# Patient Record
Sex: MALE | Race: WHITE
[De-identification: names, ages, dates, MRNs, and addresses within clinical notes are randomized per-mention and may not be internally consistent; named-entity substitution may affect disease eponyms.]

---

## 2017-01-17 ENCOUNTER — HOSPITAL ENCOUNTER (EMERGENCY)
Dept: HOSPITAL 75 - ER | Age: 27
LOS: 1 days | Discharge: HOME | End: 2017-01-18
Payer: SELF-PAY

## 2017-01-17 VITALS — WEIGHT: 140 LBS | BODY MASS INDEX: 20.73 KG/M2 | HEIGHT: 69 IN

## 2017-01-17 DIAGNOSIS — F17.210: ICD-10-CM

## 2017-01-17 DIAGNOSIS — J09.X2: Primary | ICD-10-CM

## 2017-01-17 PROCEDURE — 87804 INFLUENZA ASSAY W/OPTIC: CPT

## 2017-01-17 PROCEDURE — 99282 EMERGENCY DEPT VISIT SF MDM: CPT

## 2017-01-17 NOTE — ED COUGH/URI
General


Chief Complaint:  Cough/Cold/Flu Symptoms


Stated Complaint:  FEVER,VOMITING,COUGH


Nursing Triage Note:  


c/o cough and congestion with fever x 2 days


Source:  patient





History of Present Illness


Time seen by provider:  23:25


Initial Comments


PT STATES HE HAS BEEN SICK SINCE YESTERDAY


HAS HAD FEVER .9


C/O NON-PRODUCTIVE COUGH, AND CLEAR NASAL DRAINAGE


C/O HEADACHE


C/O BODY ACHES


HAS VOMITED X 2 SECONDARY TO COUGHING AND GAGGING


TOOK OTC COLD/FLU MEDICATION  MG OF IBUPROFEN JUST PRIOR TO ARRIVAL AND 

STATES IT HAS HELPED


MULTIPLE SICK CONTACTS WITH SAME--STATES KIDS ALL HAVE "BRONCHITIS OR PNEUMONIA

" AND URI SYMPTOMS





PCP: [facility]





Allergies and Home Medications


Allergies


Coded Allergies:  


     No Known Allergies (Unverified  Allergy, Mild, 4/22/09)


     No Known Drug Allergies (Unverified  Allergy, Mild, 5/2/09)





Home Medications


Benzonatate 100 Mg Capsule #30 1-2 TAB PO TID 


   Prescribed by: JOSE ANTONIO NORIEGA on 1/17/17 2356


Dextroamphetamine/Amphetamine 20 Mg Tablet #60 (Reported) 


Naproxen 500 Mg Tablet #20 500 MG PO BID PRN PRN PAIN 


   Prescribed by: LAVERNE CHEW on 9/11/16 1950


Oseltamivir Phosphate 75 Mg Cap #10 75 MG PO BID 


   Prescribed by: JOSE ANTONIO NORIEGA on 1/17/17 2356





Constitutional:   see HPI fever malaise


EENTM:   nose congestion see HPI


Respiratory:   see HPI coughNo short of breath, No wheezing


Cardiovascular:   no symptoms reported


Gastrointestinal:   see HPINo abdominal pain, No diarrhea, No nausea,  vomiting


Genitourinary:   no symptoms reported


Musculoskeletal:   see HPI (BODY ACHES)


Skin:   no symptoms reported


Psychiatric/Neurological:   See HPI Headache


Hematologic/Lymphatic:   No Symptoms Reported


Immunological/Allergic:   no symptoms reported





Past Medical-Social-Family Hx


Patient Social History


Alcohol Use:  Occasionally Uses


Recreational Drug Use:  No


Smoking Status:  Current Everyday Smoker (1 PPD)


Type Used:  Cigarettes


Recent Foreign Travel:  No


Contact w/Someone Who Travel:  No


Recent Infectious Disease Expo:  No


Recent Hopitalizations:  No


Physical Abuse Screen:  No


Sexual Abuse:  No





Immunizations Up To Date


Tetanus Booster (TDap):  Unknown


Date of Influenza Vaccine:  Nov 14, 2013





Seasonal Allergies


Seasonal Allergies:  No





Surgeries


HX Surgeries:  No





Respiratory


Hx Respiratory Disorders:  No





Cardiovascular


Hx Cardiac Disorders:  No





Neurological


Hx Neurological Disorders:  No





Reproductive System


Hx Reproductive Disorders:  No


Sexually Transmitted Disease:  No


HIV/AIDS:  No





Genitourinary


Hx Genitourinary Disorders:  No





Gastrointestinal


Hx Gastrointestinal Disorders:  No





Musculoskeletal


Hx Musculoskeletal Disorders:  Yes


Musculoskeletal Disorders:  Chronic Back Pain





Endocrine


Hx Endocrine Disorders:  No





HEENT


HX ENT Disorders:  No





Cancer


Hx Cancer:  No





Psychosocial


Hx Psychiatric Problems:  Yes (WAS ON CELEXA, BUT RAN OUT 2 WEEKS AGO, PER PT 

ON 01/17/17)


Behavioral Health Disorders:  Depression





Integumentary


HX Skin/Integumentary Disorder:  No





Blood Transfusions


Hx Blood Disorders:  No


Adverse Reaction to a Blood Tr:  No





Physical Exam


Vital Signs





 Vital Sign - Last 12Hours








 1/17/17 1/17/17





 23:21 23:30


 


Temp 98.8 


 


Pulse 110 


 


Resp 16 


 


B/P 109/77 


 


Pulse Ox 96 


 


O2 Delivery  Room Air





Capillary Refill : Less Than 3 Seconds


General Appearance:   other (LOOKS MILDLY ILL) thin


HEENT:   PERRL/EOMI TMs normal pharynx normal other (NASAL MUCOSAL CONGESTION 

AND CLEAR RHINORRHEA. POOR DENTITION )


Neck:   non-tender full range of motion supple normal inspection


Respiratory:   normal breath sounds no respiratory distress no accessory muscle 

use


Cardiovascular:   regular rate, rhythm no murmur


Gastrointestinal:   normal bowel sounds non tender soft


Extremities:   normal inspection


Neurologic/Psychiatric:   CNs II-XII nml as tested no motor/sensory deficits 

alert normal mood/affect oriented x 3


Skin:   normal color warm/dryNo rash,  tattoos/piercings (MUJLTIPLE TATTOOS)





Progress/Results/Core Measures


Results/Orders


Micro Results





 Microbiology


1/17/17 Influenza Types A,B Antigen (YUNIOR) - Final, Complete


          





My Orders





 Orders-JOSE ANTONIO NORIEGA DO


Influenza A And B Antigens (1/17/17 23:29)





Vital Signs/I&O





 Vital Sign - Last 12Hours








 1/17/17 1/17/17 1/18/17





 23:21 23:30 00:06


 


Temp 98.8  


 


Pulse 110  83


 


Resp 16  20


 


B/P 109/77  


 


Pulse Ox 96  95


 


O2 Delivery  Room Air 








Blood Pressure Mean:  88





Departure


Impression


Impression:  


 Primary Impression:  


 Influenza A


Disposition:  01 HOME, SELF-CARE


Condition:  Improved





Departure-Patient Inst.


Referrals:  


[facility] (PCP/Family)


Primary Care Physician


Patient Instructions:  Flu, Adult (DC)





Add. Discharge Instructions:


ROBITUSSIN DM FOR COUGH





TYLENOL 1 GRAM /MOTRIN 800 MG 4 TIMES A DAY AS NEEDED FOR PAIN OR FEVER





FOLLOW UP WITH DR OF CHOICE IN 4-5 DAYS IF NO BETTER





All discharge instructions reviewed with patient and/or family. Voiced 

understanding.


Scripts


Benzonatate (Tessalon Perle)100 Mg Capsule1-2 Tab PO TID Cough #30 CAP


   Prov:JOSE ANTONIO NORIEGA DO         1/17/17


Oseltamivir Phosphate (Tamiflu)75 Mg Cap75 Mg PO BID #10 CAP


   Prov:JOSE ANTONIO NORIEGA DO         1/17/17


Work/School Note:  Work Release Form   Date Seen in the Emergency Department:  

Jan 17, 2017


   Return to Work:  Jan 20, 2017








JOSE ANTONIO NORIEGA DO Jan 17, 2017 23:56

## 2017-01-18 VITALS — DIASTOLIC BLOOD PRESSURE: 79 MMHG | SYSTOLIC BLOOD PRESSURE: 112 MMHG

## 2017-04-18 ENCOUNTER — HOSPITAL ENCOUNTER (EMERGENCY)
Dept: HOSPITAL 75 - ER | Age: 27
Discharge: HOME | End: 2017-04-18
Payer: SELF-PAY

## 2017-04-18 VITALS — DIASTOLIC BLOOD PRESSURE: 96 MMHG | SYSTOLIC BLOOD PRESSURE: 154 MMHG

## 2017-04-18 VITALS — BODY MASS INDEX: 20.73 KG/M2 | HEIGHT: 69 IN | WEIGHT: 140 LBS

## 2017-04-18 DIAGNOSIS — F17.210: ICD-10-CM

## 2017-04-18 DIAGNOSIS — K02.9: ICD-10-CM

## 2017-04-18 DIAGNOSIS — K04.7: Primary | ICD-10-CM

## 2017-04-18 PROCEDURE — 99282 EMERGENCY DEPT VISIT SF MDM: CPT

## 2017-04-18 NOTE — ED EENT
History of Present Illness


General


Chief Complaint:  Dental Problems/Pain


Stated Complaint:  DENTAL PAIN


Nursing Triage Note:  


pt c/o left lower dental pain x 2 days, severe tonight.


Source:  patient





History of Present Illness


Time seen by provider:  03:25


Initial Comments


C/O DENTAL PAIN FOR THE PAST COUPLE OF DAYS, WORSE TONIGHT


PT HAS CHRONIC DENTAL PROBLEMS, HAS NOT SEEN A DENTIST IN YEARS


DID GO TO [facility] A FEW YEARS AGO FOR THIS PROBLEM AND WAS REFERRED 

TO AN ORAL SURGEON TO HAVE TEETH REMOVED, BUT PT NEVER WENT DUE TO FINANCES.





NO FEVER


NO SWELLING OF FACE/JAW





PCP; [facility]





Allergies and Home Medications


Allergies


Coded Allergies:  


     NKANo Known Allergies (Unverified  Allergy, Mild, 4/22/09)


     No Known Drug Allergies (Unverified , 5/2/09)





Home Medications


Amoxicillin/Potassium Clav 1 Each Tablet, 1 EACH PO BID, #20


   Prescribed by: JOSE ANTONIO NORIEGA on 4/18/17 0335


Lidocaine HCl 15 Ml Solution, 0 MM Q 1-2 HOURS, #50


   Prescribed by: JOSE ANTONIO NORIEGA on 4/18/17 0335


Naproxen 500 Mg Tablet, 500 MG PO BID, #20


   Prescribed by: JOSE ANTONIO NORIEGA on 4/18/17 0335


[ibuprofen]  , (Reported)





Review of Systems


Constitutional:  no symptoms reported


Eyes:  No Symptoms Reported


Ears:  No Symptoms Reported


Nose:  no symptoms reported


Mouth:  see HPI


Throat:  no symptoms reported


Respiratory:  no symptoms reported


Cardiovascular:  no symptoms reported


Gastrointestinal:  no symptoms reported


Musculoskeletal:  no symptoms reported


Skin:  no symptoms reported


Neurological:  No Symptoms Reported


Hematologic/Lymphatic:  No Symptoms Reported





Past Medical-Social-Family Hx


Patient Social History


Alcohol Use:  Occasionally Uses


Recreational Drug Use:  No


Smoking Status:  Current Everyday Smoker (1 PPD)


Type Used:  Cigarettes


Recent Foreign Travel:  No


Contact w/Someone Who Travel:  No


Recent Infectious Disease Expo:  No


Recent Hopitalizations:  No





Immunizations Up To Date


Tetanus Booster (TDap):  Unknown


Date of Influenza Vaccine:  Nov 14, 2013





Seasonal Allergies


Seasonal Allergies:  No





Surgeries


HX Surgeries:  No





Respiratory


Hx Respiratory Disorders:  No





Cardiovascular


Hx Cardiac Disorders:  No





Neurological


Hx Neurological Disorders:  No





Reproductive System


Hx Reproductive Disorders:  No


Sexually Transmitted Disease:  No


HIV/AIDS:  No





Genitourinary


Hx Genitourinary Disorders:  No





Gastrointestinal


Hx Gastrointestinal Disorders:  No





Musculoskeletal


Hx Musculoskeletal Disorders:  Yes


Musculoskeletal Disorders:  Chronic Back Pain





Endocrine


Hx Endocrine Disorders:  No





HEENT


HX ENT Disorders:  Yes (DENTAL CARIES/ABSCESSES, CHRONIC DENTAL PAIN )





Cancer


Hx Cancer:  No





Psychosocial


Hx Psychiatric Problems:  Yes


Behavioral Health Disorders:  Depression





Integumentary


HX Skin/Integumentary Disorder:  No





Blood Transfusions


Hx Blood Disorders:  No


Adverse Reaction to a Blood Tr:  No





Physical Exam


Vital Signs





Vital Sign - Last 12Hours








 4/18/17





 03:24


 


Temp 96.9


 


Pulse 73


 


Resp 16


 


B/P (MAP) 160/105








General Appearance:  WD/WN, no apparent distress, other (HOLDING LEFT JAW AREA)


Eyes:  bilateral eye EOMI, bilateral eye PERRL, bilateral eye normal inspection

, bilateral eye other (GLASSES)


Ears:  bilateral ear TM normal


Nose:  normal inspection


Mouth/Throat:  other (EXTENSIVE DENTAL DECAY--MANY TEETH, ESPECIALLY ALL MOLARS

,  DECAYED DOWN TO GUMS--LEFT LOWER MOLARS WITH MODERATE SURROUNDING GUM TISSUE 

INFLAMED, SWOLLED AND VERY TENDER. NO MANDIBULAR SWELLING. )


Neck:  non-tender, full range of motion, supple, normal inspection, No 

lymphadenopathy (R), No lymphadenopathy (L)


Cardiovascular:  regular rate, rhythm, no murmur


Respiratory:  normal breath sounds, no respiratory distress, no accessory 

muscle use


Gastrointestinal:  non tender, soft


Neurologic/Psychiatric:  CNs II-XII nml as tested, no motor/sensory deficits, 

alert, oriented x 3


Skin:  normal color, warm/dry





Progress/Results/Core Measures


Results/Orders


My Orders





Orders - JOSE ANTONIO NORIEGA DO


Amoxicillin/Clavulanate Tablet (Augmenti (4/18/17 07:00)


Rx-Naproxen (Rx-Naprosyn) (4/18/17 03:32)


Lidocaine 2% Viscous 15 Ml (Xylocaine Vi (4/18/17 03:45)





Vital Signs/I&O





Vital Sign - Last 12Hours








 4/18/17





 03:24


 


Temp 96.9


 


Pulse 73


 


Resp 16


 


B/P (MAP) 160/105














Blood Pressure Mean:  123











Departure


Impression


Impression:  


 Primary Impression:  


 Dental caries


 Additional Impression:  


 Dental abscess


Disposition:  01 HOME, SELF-CARE


Condition:  Stable





Departure-Patient Inst.


Referrals:  


[facility] (PCP/Family)


Primary Care Physician


Patient Instructions:  Tooth Decay, Adult (DC), Tooth Abscess (DC)





Add. Discharge Instructions:  


FREQUENT SALT WATER AND LISTERINE SWISHES





FOLLOW UP WITH [facility] THIS WEEK FOR FURTHER CARE





All discharge instructions reviewed with patient and/or family. Voiced 

understanding.


Scripts


Naproxen (Naproxen) 500 Mg Tablet


500 MG PO BID, #20 TAB


   Prov: JOSE ANTONIO NORIEGA DO         4/18/17 


Lidocaine HCl (Lidocaine HCl Viscous) 15 Ml Solution


0 MM Q 1-2 HOURS for Pain, #50 EA


   Prov: JOSE ANTONIO NORIEGA DO         4/18/17 


Amoxicillin/Potassium Clav (Augmentin 875-125 Tablet) 1 Each Tablet


1 EACH PO BID for INFECTION, #20 TAB


   Prov: JOSE ANTONIO NORIEGA DO         4/18/17











JOSE ANTONIO NORIEGA DO Apr 18, 2017 03:35

## 2018-02-06 ENCOUNTER — HOSPITAL ENCOUNTER (EMERGENCY)
Dept: HOSPITAL 75 - ER | Age: 28
Discharge: HOME | End: 2018-02-06
Payer: COMMERCIAL

## 2018-02-06 VITALS — WEIGHT: 140 LBS | HEIGHT: 69 IN | BODY MASS INDEX: 20.73 KG/M2

## 2018-02-06 DIAGNOSIS — K02.9: Primary | ICD-10-CM

## 2018-02-06 DIAGNOSIS — F17.210: ICD-10-CM

## 2018-02-06 DIAGNOSIS — F32.9: ICD-10-CM

## 2018-02-06 PROCEDURE — 99283 EMERGENCY DEPT VISIT LOW MDM: CPT

## 2018-02-06 NOTE — ED EENT
History of Present Illness


General


Chief Complaint:  Dental Problems/Pain


Stated Complaint:  DENTAL PAIN


Nursing Triage Note:  


PT TO ED 5 W/ C/O DENTAL PAIN, CHRONIC, WORSE LAST NOC.


Source:  patient


Exam Limitations:  no limitations





History of Present Illness


Date Seen by Provider:  Feb 6, 2018


Time Seen by Provider:  02:14


Initial Comments


Here with report of dental pain that has been going on over the last 24 hours.  

States it feels like last year in April when he had the same.  At that time he 

was prescribed Augmentin and that didn't help.  He knows he needs to follow 

with a dentist but has unfortunately been unable to due to finances.  This has 

improved this year and he has insurance now and his tax returns or come in and 

so he scheduling with a dentist in [place].  He needs oral surgeon to 

remove multiple molars that are terribly decayed down to the gum lines on both 

upper and lower and bilateral.


Timing/Duration:  gradual


Severity:  moderate


Location:  dental


Prearrival Treatment:  over the counter meds


Associated Symptoms:  No cough, No nasal congestion/drainage, No sore throat, 

tooth pain





Allergies and Home Medications


Allergies


Coded Allergies:  


     NKANo Known Allergies (Unverified  Allergy, Mild, 4/22/09)


     No Known Drug Allergies (Unverified , 5/2/09)





Home Medications


Amoxicillin/Potassium Clav 1 Each Tablet, 1 EACH PO BID, #20


   Prescribed by: JOSE ANTONIO NORIEGA on 4/18/17 0335


Lidocaine HCl 15 Ml Solution, 0 MM Q 1-2 HOURS, #50


   Prescribed by: JOSE ANTOINO NORIEGA on 4/18/17 0335


Naproxen 500 Mg Tablet, 500 MG PO BID, #20


   Prescribed by: JOSE ANTONIO NORIEGA on 4/18/17 0335


[ibuprofen]  , (Reported)





Review of Systems


Constitutional:  see HPI, No chills, No fever


Ears:  No Symptoms Reported


Nose:  no symptoms reported


Mouth:  see HPI, pain, denies purulent discharge


Throat:  no symptoms reported


Respiratory:  no symptoms reported


Cardiovascular:  no symptoms reported


Skin:  no symptoms reported





Past Medical-Social-Family Hx


Patient Social History


Alcohol Use:  Occasionally Uses


Recreational Drug Use:  No


Smoking Status:  Current Everyday Smoker


Type Used:  Cigarettes


Recent Foreign Travel:  No


Contact w/Someone Who Travel:  No


Recent Infectious Disease Expo:  No


Recent Hopitalizations:  No


Physical Abuse:  No


Sexual Abuse:  No


Mistreated:  No


Fear:  No





Immunizations Up To Date


Tetanus Booster (TDap):  Unknown


Date of Influenza Vaccine:  Nov 14, 2013





Seasonal Allergies


Seasonal Allergies:  No





Surgeries


History of Surgeries:  No





Respiratory


History of Respiratory Disorde:  No





Cardiovascular


History of Cardiac Disorders:  No





Neurological


History of Neurological Disord:  No





Reproductive System


Hx Reproductive Disorders:  No


Sexually Transmitted Disease:  No


HIV/AIDS:  No





Gastrointestinal


History of Gastrointestinal Di:  No





Musculoskeletal


History of Musculoskeletal Dis:  Yes


Musculoskeletal Disorders:  Chronic Back Pain





Endocrine


History of Endocrine Disorders:  No





Cancer


History of Cancer:  No





Psychosocial


History of Psychiatric Problem:  Yes


Behavioral Health Disorders:  Depression


Suicide Risk Score:  0





Integumentary


History of Skin or Integumenta:  No





Blood Transfusions


History of Blood Disorders:  No


Adverse Reaction to a Blood Tr:  No





Reviewed Nursing Assessment


Reviewed/Agree w Nursing PMH:  Yes





Family Medical History


Significant Family History:  No Pertinent Family Hx





Physical Exam


Vital Signs





Vital Sign - Last 12Hours








 2/6/18





 02:06


 


Temp 98.1


 


Pulse 88


 


Resp 20


 


B/P (MAP) 138/82 (100)


 


Pulse Ox 99


 


O2 Delivery Room Air








General Appearance:  WD/WN, no apparent distress


Eyes:  bilateral eye normal inspection, bilateral eye PERRL, bilateral eye EOMI


Ears:  bilateral ear auricle normal, bilateral ear canal normal, bilateral ear 

TM normal


Nose:  normal inspection


Mouth/Throat:  dental tenderness, other (bilateral upper and lower posterior 

teeth including all molars significantly decayed with mild erythema on the gum 

line around those teeth.  No significant abscess noted.)


Neck:  full range of motion, supple


Cardiovascular:  regular rate, rhythm, no murmur


Respiratory:  lungs clear, normal breath sounds


Neurologic/Psychiatric:  alert, oriented x 3


Skin:  normal color, warm/dry





Progress/Results/Core Measures


Results/Orders


My Orders





Orders - HOMER LARES MD


Lidocaine 2% Viscous 15 Ml (Xylocaine Vi (2/6/18 02:24)


Hydrocodone/Apap 7.5/325 Tab (Lortab 7. (2/6/18 02:27)


Augmentin 875mg Po (2/6/18 02:27)


Lidocaine 2% Viscous 15 Ml (Xylocaine Vi (2/6/18 02:30)





Vital Signs/I&O





Vital Sign - Last 12Hours








 2/6/18





 02:06


 


Temp 98.1


 


Pulse 88


 


Resp 20


 


B/P (MAP) 138/82 (100)


 


Pulse Ox 99


 


O2 Delivery Room Air














Blood Pressure Mean:  100








Progress Note :  


Progress Note


Seen and evaluated.  Hydrocodone 7.5/325 one tab by mouth.  I decayed mixture 

given.  Augmentin 875 mg by mouth initiated.  Discharged home with return 

precautions.  Patient verbalize understanding instructions and agreement with 

plan.





Departure


Impression


Impression:  


 Primary Impression:  


 Dental caries


Disposition:  01 HOME, SELF-CARE


Condition:  Stable





Departure-Patient Inst.


Decision time for Depature:  02:33


Referrals:  


[facility] (PCP/Family)


Primary Care Physician


Patient Instructions:  Tooth Decay, Adult (DC), Dental Pain (DC)





Add. Discharge Instructions:  


All discharge instructions reviewed with patient and/or family. Voiced 

understanding.





It is very important that he follow-up with a dentist/oral surgeon of your 

choosing as soon as possible.  Take medications as directed.  You may use the 

lidocaine mixture 2-3 times per hour as needed.  Try not to swallow the 

solution.  Return for worse pain, fever, vomiting, weakness, breathing problems 

or other concerns as needed.  Drink plenty of fluids.  You may take ibuprofen 

800 mg every 8 hours as needed for pain.  You may take Tylenol or the generic 

acetaminophen, 1000 mg every 8 hours as needed for pain if you're not taking 

the prescribed pain medicine as they both have acetaminophen in them.


Scripts


Chlorhexidine Gluconate (Chlorhexidine Gluconate) 473 Ml Mouthwash


15 ML MM BID, #473 ML


   swish and spit solution


   Prov: HOMER LARES MD         2/6/18 


Hydrocodone Bit/Acetaminophen (Hydrocodone/Acetaminophen 5/325mg Tablet) 1 Tab 

Tab


1-2 EACH PO Q6H Y for PAIN-MODERATE, #15 TAB 0 Refills


   Prov: HOMER LARES MD         2/6/18 


Amoxicillin/Potassium Clav (Amoxicillin-Clav ER 1,000-62.5) 1 Each Tab.er.12h


1 EACH PO BID, #14 TAB


   Prov: HOMER LARES MD         2/6/18











HOMER LARES MD Feb 6, 2018 02:35

## 2019-08-22 ENCOUNTER — HOSPITAL ENCOUNTER (EMERGENCY)
Dept: HOSPITAL 75 - ER | Age: 29
Discharge: HOME | End: 2019-08-22
Payer: SELF-PAY

## 2019-08-22 VITALS — BODY MASS INDEX: 20.73 KG/M2 | HEIGHT: 69 IN | WEIGHT: 140 LBS

## 2019-08-22 VITALS — DIASTOLIC BLOOD PRESSURE: 84 MMHG | SYSTOLIC BLOOD PRESSURE: 135 MMHG

## 2019-08-22 DIAGNOSIS — F17.210: ICD-10-CM

## 2019-08-22 DIAGNOSIS — K02.9: ICD-10-CM

## 2019-08-22 DIAGNOSIS — K04.7: Primary | ICD-10-CM

## 2019-08-22 DIAGNOSIS — F32.9: ICD-10-CM

## 2019-08-22 PROCEDURE — 87205 SMEAR GRAM STAIN: CPT

## 2019-08-22 PROCEDURE — 87070 CULTURE OTHR SPECIMN AEROBIC: CPT

## 2019-08-22 PROCEDURE — 41800 DRAINAGE OF GUM LESION: CPT

## 2019-08-22 NOTE — ED EENT
History of Present Illness


General


Chief Complaint:  Dental Problems/Pain


Stated Complaint:  DENTAL PAIN


Nursing Triage Note:  


PT PRESENTS TO ED WITH COMPLAINTS OF R SIDED DENTAL PAIN X 2 DAYS. 


 (ALEYDA DUQUE)


Source:  patient


Exam Limitations:  no limitations


 (JP PAREDES MD)


History of Present Illness


Initial Comments


A 27 yo male presents to the ER complaining of toothache. Patient states that he

needs to get a tooth extraction, but he has been unable to do so and now 

believes his tooth is infected. The pain is most significant at his lower second

molar and describes the pain as sharp and constant.  The pain began about two 

days ago, but there has been no drainage and no pus. He also denies any fevers 

and chills, but does complain of some nausea. He has had trouble eating and 

drinking. Ibuprofen has helped with the pain.  


 (ALEYDA DUQUE)


Date Seen by Provider:  Aug 22, 2019


Time Seen by Provider:  08:10


 (JP PAREDES MD)





Allergies and Home Medications


Allergies


Coded Allergies:  


     NKANo Known Allergies (Unverified  Allergy, Mild, 4/22/09)


     No Known Drug Allergies (Unverified , 5/2/09)





Home Medications


Amoxicillin 500 Mg Capsule, 500 MG PO TID


   Prescribed by: ADRIANNA FELIPE on 8/27/18 0040


Amoxicillin 500 Mg Capsule, 1,000 MG PO BID


   Prescribed by: JP SWIFT on 8/22/19 0836


Amoxicillin/Potassium Clav 1 Each Tablet, 1 EACH PO BID


   Prescribed by: JOSE ANTONIO NORIEGA on 4/18/17 0335


Amoxicillin/Potassium Clav 1 Each Tab.er.12h, 1 EACH PO BID


   Prescribed by: HOMER LARES on 2/6/18 0240


Chlorhexidine Gluconate 473 Ml Mouthwash, 15 ML MM BID


   swish and spit solution 


   Prescribed by: HOMER LARES on 2/6/18 0240


Chlorhexidine Gluconate 473 Ml Mouthwash, 15 ML MM BID


   Prescribed by: ADRIANNA FELIPE on 8/27/18 0042


Hydrocodone Bit/Acetaminophen 1 Tab Tab, 1-2 EACH PO Q6H PRN for PAIN-MODERATE


   Prescribed by: HOMER LARES on 2/6/18 0240


Hydrocodone Bit/Acetaminophen 1 Tab Tab, 1 EACH PO Q4-6HR PRN for PAIN-MODERATE


   Prescribed by: JP SWIFT on 8/22/19 0836


Lidocaine HCl 15 Ml Solution, 0 MM Q 1-2 HOURS


   Prescribed by: JOSE ANTONIO NORIEGA on 4/18/17 0335


Naproxen 500 Mg Tablet, 500 MG PO BID


   Prescribed by: JOSE ANTONIO NORIEGA on 4/18/17 0335





Patient Home Medication List


Home Medication List Reviewed:  Yes


 (JP PAREDES MD)





Review of Systems


Review of Systems


Constitutional:  No chills, No fever


Mouth:  pain, swelling; denies bloody discharge, denies clear discharge, denies 

purulent discharge


Throat:  denies pain, denies hoarse


Gastrointestinal:  loss of appetite, nausea; No vomiting (ALEYDA DUQUE)





Past Medical-Social-Family Hx


Patient Social History


Alcohol Use:  Denies Use


Recreational Drug Use:  No (SMOKES 1/2 PPD)


Smoking Status:  Current Someday Smoker


Type Used:  Cigarettes


Recent Foreign Travel:  No


Contact w/Someone Who Travel:  No


Recent Infectious Disease Expo:  No


Recent Hopitalizations:  No


Physical Abuse:  No


Sexual Abuse:  No


Mistreated:  No


Fear:  No


 (ALEYDA DUQUE)





Immunizations Up To Date


Tetanus Booster (TDap):  Unknown


Date of Influenza Vaccine:  Nov 14, 2013


 (ALEYDA DUQUE)





Seasonal Allergies


Seasonal Allergies:  No


 (ALEYDA DUQUE)





Past Medical History


Surgeries:  No


Respiratory:  No


Cardiac:  No


Neurological:  No


Reproductive Disorders:  No


Sexually Transmitted Disease:  No


HIV/AIDS:  No


Genitourinary:  No


Gastrointestinal:  No


Musculoskeletal:  Yes


Chronic Back Pain


Endocrine:  No


Cancer:  No


Psychosocial:  Yes


Depression


Integumentary:  No


Blood Disorders:  No


Adverse Reaction/Blood Tranf:  No


 (ALEYDA DUQUE)





Family Medical History


No Pertinent Family Hx


 (ALEYDA DUQUE)





Physical Exam


Vital Signs





Vital Signs - First Documented








 8/22/19





 08:04


 


Temp 98.4


 


Pulse 82


 


Resp 18


 


B/P (MAP) 157/97 (117)


 


Pulse Ox 99





 (JP PAREDES MD)


Height, Weight, BMI


Height: 5'9.00"


Weight: 140lbs. oz. 63.125181eh; 22.15 BMI


Method:Stated


General Appearance:  WD/WN, mild distress


Eyes:  bilateral eye normal inspection


Ears:  bilateral ear auricle normal, bilateral ear canal normal, bilateral ear 

TM normal


Mouth/Throat:  pharynx normal, dental tenderness (Significant of the right lower

mandible); No excessive drooling; mandibular swelling (On the Right), maxillary 

swelling (On the right); No tongue swollen, No uvula swelling, No voice changes;

other (Purulent abscess on the Lower Right 2nd molar)


Neck:  non-tender, normal inspection; No lymphadenopathy (R), No lymphadenopathy

(L)


Cardiovascular:  regular rate, rhythm, no edema, no gallop, no JVD, no murmur


Respiratory:  chest non-tender, lungs clear, normal breath sounds, no respirat

ory distress, no accessory muscle use


Neurologic/Psychiatric:  alert, oriented x 3


Skin:  normal color, warm/dry (ALEYDA DUQUE)





Procedures/Interventions


I&D :  


   Blade Size:  11


Progress


Topical anesthetic was provided with Hurricaine spray.  A scalpel was then used 

to nick the abscess resulting in significant drainage of purulent material which

was cultured.


 (JP PAREDES MD)





Progress/Results/Core Measures


Results/Orders


My Orders





Orders - JP PAREDES MD


Ondansetron  Oral Dissolve Tab (Zofran (8/22/19 08:34)


Hydrocodone/Apap 5/325 Tablet (Lortab 5 (8/22/19 09:00)


Wound Culture (8/22/19 08:51)


Ondansetron  Oral Dissolve Tab (Zofran (8/22/19 09:00)


 (JP PAREDES MD)


Medications Given in ED





Current Medications








 Medications  Dose


 Ordered  Sig/Maria


 Route  Start Time


 Stop Time Status Last Admin


Dose Admin


 


 Acetaminophen/


 Hydrocodone Bitart  1 tab  ONCE  ONCE


 PO  8/22/19 09:00


 8/22/19 09:00 DC 8/22/19 08:53


1 TAB


 


 Ondansetron HCl  4 mg  ONCE  ONCE


 PO  8/22/19 09:00


 8/22/19 09:01 DC 8/22/19 08:35


4 MG





 (JP PAREDES MD)


Vital Signs/I&O











 8/22/19 8/22/19





 08:04 08:57


 


Temp 98.4 


 


Pulse 82 80


 


Resp 18 16


 


B/P (MAP) 157/97 (117) 135/84 (101)


 


Pulse Ox 99 98





 (JP PAREDES MD)








Blood Pressure Mean:                    117











Progress


Progress Note :  


   Time:  09:06


Progress Note


Impaction of the wisdom tooth causing infection of the right lower 2nd molar. 


Lack of adequate hygiene, resulting in increased risk of infection. 


Contacting dentist is recommended for appropriate dental care. 


Pain, Nausea, and possible infection was appropriately treated with medication. 


Recommended Dental Insurance for appropriate dental procedures needed. 


 (ALEYDA DUQUE [facility])





Departure


Impression





   Primary Impression:  


   Dental abscess


   Additional Impressions:  


   Dental decay


   Encounter for incision and drainage procedure


Disposition:  01 HOME, SELF-CARE


Condition:  Improved





Departure-Patient Inst.


Decision time for Depature:  08:30


 (JP PAREDES MD)


Referrals:  


[facility] [place]


Primary Care Physician


Patient Instructions:  Tooth Decay, Adult (DC), Tooth Abscess (DC)





Add. Discharge Instructions:  


Complete your antibiotics as soon as possible.


Follow-up with a dentist or oral surgeon for dental extraction as soon as 

possible.


You may take ibuprofen up to 600 mg every 6 hours as needed for pain.


Add either Tylenol (acetaminophen) up to 1000 mg every 6 hours as needed or 

hydrocodone as prescribed for additional pain control.


Gently brush your teeth at least twice daily with a soft bristle toothbrush.  

Also use an antiseptic mouth wash twice daily if tolerated.


Return to care if you have worsening symptoms, especially if you develop fevers 

over 100.











All discharge instructions reviewed with patient and/or family. Voiced understan

ding.


Scripts


Hydrocodone Bit/Acetaminophen (Hydrocodone/Acetaminophen 5/325mg Tablet) 1 Tab 

Tab


1 EACH PO Q4-6HR PRN for PAIN-MODERATE MDD 10, #10 TAB


   Prov: JP PAREDES MD         8/22/19 


Amoxicillin (Amoxicillin) 500 Mg Capsule


1000 MG PO BID, #40 CAP 0 Refills


   Prov: JP PAREDES MD         8/22/19


This patient was seen and examined by me along with Aleyda Duque, MS3,  I 

agree with his history and physical.  Patient presents with right-sided jaw pain

due to poor dentition and dental decay.  He tried taking ibuprofen which did not

improve his pain.  He has swelling along the jawline as well.  He is nauseated 

but denies fever or chills.  He has been able to eat and drink.  He has been to 

a dentist previously and was referred to an oral surgeon for dental extractions.

 He has not yet pursued the extractions due to cost.  He now presumes infection.





Exam:


HEENT: Tympanic membranes normal, oropharynx normal, there is a small bubbling 

abscess lateral to the right lower molars with localized associated erythema.  

There are several severely eroded teeth


Neck: Normal to inspection, supple, no lymphadenopathy


Heart: Regular rate and rhythm without murmur


Lungs: clear to auscultation bilaterally





Topical anesthesia with Hurricaine spray was applied to the abscess.  It was 

then nicked with a scalpel and a significant amount of purulent material was 

drained.  Patient was given hydrocodone for pain control.  Wound culture was 

obtained.


 (JP PAREDES MD)











ALEYDA DUQUE [facility]    Aug 22, 2019 08:30


JP PAREDES MD        Aug 22, 2019 08:31

## 2021-06-15 ENCOUNTER — HOSPITAL ENCOUNTER (EMERGENCY)
Dept: HOSPITAL 75 - ER | Age: 31
Discharge: HOME | End: 2021-06-15
Payer: COMMERCIAL

## 2021-06-15 VITALS — BODY MASS INDEX: 22.65 KG/M2 | HEIGHT: 67.8 IN | WEIGHT: 147.71 LBS

## 2021-06-15 VITALS — SYSTOLIC BLOOD PRESSURE: 118 MMHG | DIASTOLIC BLOOD PRESSURE: 86 MMHG

## 2021-06-15 DIAGNOSIS — G89.29: ICD-10-CM

## 2021-06-15 DIAGNOSIS — Z79.891: ICD-10-CM

## 2021-06-15 DIAGNOSIS — M54.9: ICD-10-CM
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
## 2021-06-15 DIAGNOSIS — S90.562A: Primary | ICD-10-CM

## 2021-06-15 PROCEDURE — 99282 EMERGENCY DEPT VISIT SF MDM: CPT

## 2021-06-15 NOTE — ED INTEGUMENTARY GENERAL
General


Stated Complaint:  RASH


Source:  patient


Exam Limitations:  no limitations





History of Present Illness


Date Seen by Provider:  Jaiden 15, 2021


Time Seen by Provider:  11:51


Initial Comments


To ER with a single erythematous itchy bump to the lateral left ankle after 

being outside yesterday.


Timing/Duration:  yesterday


Severity:  mild


Location:  extremities


Associated Symptoms:  denies symptoms





Allergies and Home Medications


Allergies


Coded Allergies:  


     NKANo Known Allergies (Unverified  Allergy, Mild, 4/22/09)


     No Known Drug Allergies (Unverified , 5/2/09)





Home Medications


Amoxicillin 500 Mg Capsule, 500 MG PO TID


   Prescribed by: ADRIANNA FELIPE on 8/27/18 0040


Amoxicillin 500 Mg Capsule, 1,000 MG PO BID


   Prescribed by: JP SWIFT on 8/22/19 0836


Amoxicillin/Potassium Clav 1 Each Tablet, 1 EACH PO BID


   Prescribed by: JOSE ANTONIO NORIEGA on 4/18/17 0335


Amoxicillin/Potassium Clav 1 Each Tab.er.12h, 1 EACH PO BID


   Prescribed by: HOMER LARES on 2/6/18 0240


Chlorhexidine Gluconate 473 Ml Mouthwash, 15 ML MM BID


   swish and spit solution 


   Prescribed by: HOMER LARES on 2/6/18 0240


Chlorhexidine Gluconate 473 Ml Mouthwash, 15 ML MM BID


   Prescribed by: ADRIANNA FELIPE on 8/27/18 0042


Hydrocodone Bit/Acetaminophen 1 Tab Tab, 1-2 EACH PO Q6H PRN for PAIN-MODERATE


   Prescribed by: HOMER LARES on 2/6/18 0240


Hydrocodone Bit/Acetaminophen 1 Tab Tab, 1 EACH PO Q4-6HR PRN for PAIN-MODERATE


   Prescribed by: JP SWIFT on 8/22/19 0836


Lidocaine HCl 15 Ml Solution, 0 MM Q 1-2 HOURS


   Prescribed by: JOSE ANTONIO NORIEGA on 4/18/17 0335


Naproxen 500 Mg Tablet, 500 MG PO BID


   Prescribed by: JOSE ANTONIO NORIEGA on 4/18/17 0335





Patient Home Medication List


Home Medication List Reviewed:  Yes





Review of Systems


Review of Systems


Constitutional:  see HPI


EENTM:  see HPI


Respiratory:  no symptoms reported


Cardiovascular:  no symptoms reported


Genitourinary:  no symptoms reported


Musculoskeletal:  no symptoms reported


Skin:  see HPI


Psychiatric/Neurological:  No Symptoms Reported


Endocrine:  No Symptoms Reported





Past Medical-Social-Family Hx


Patient Social History


Type Used:  Cigarettes


Recent Hopitalizations:  No





Immunizations Up To Date


Tetanus Booster (TDap):  Unknown


Date of Influenza Vaccine:  Nov 14, 2013





Seasonal Allergies


Seasonal Allergies:  No





Past Medical History


Surgeries:  No


Respiratory:  No


Cardiac:  No


Neurological:  No


Reproductive Disorders:  No


Sexually Transmitted Disease:  No


HIV/AIDS:  No


Genitourinary:  No


Gastrointestinal:  No


Musculoskeletal:  Yes


Chronic Back Pain


Endocrine:  No


Cancer:  No


Psychosocial:  Yes


Depression


Integumentary:  No


Blood Disorders:  No


Adverse Reaction/Blood Tranf:  No





Family Medical History


No Pertinent Family Hx





Physical Exam


Vital Signs


Capillary Refill :


General Appearance:  WD/WN, no apparent distress


Neck:  non-tender, full range of motion


Respiratory:  no respiratory distress, no accessory muscle use


Neurologic/Psychiatric:  alert, normal mood/affect, oriented x 3


Skin:  normal color, warm/dry


Skin Problem Character:  rash





Departure


Impression





   Primary Impression:  


   Insect bite


Disposition:  01 HOME, SELF-CARE


Condition:  Stable





Departure-Patient Inst.


Decision time for Depature:  11:53


Referrals:  


[facility] (PCP/Family)


Primary Care Physician


Patient Instructions:  Insect Bites and Stings





Add. Discharge Instructions:  


Apply the topical antiitch cream twice daily as needed


Scripts


Hydrocortisone/Aloe Vera (Hydrocortisone Plus 1% Cream) 28.4 Gm Cream..g.


2 GM TP BID PRN for ITCHING, #1 TUBE


   Prov: GARCIA CASTRO APRN         6/15/21











GARCIA CASTRO APRN             Jaiden 15, 2021 11:52

## 2021-07-02 ENCOUNTER — HOSPITAL ENCOUNTER (EMERGENCY)
Dept: HOSPITAL 75 - ER | Age: 31
LOS: 1 days | Discharge: HOME | End: 2021-07-03
Payer: COMMERCIAL

## 2021-07-02 VITALS — HEIGHT: 68.9 IN | WEIGHT: 147.71 LBS | BODY MASS INDEX: 21.88 KG/M2

## 2021-07-02 DIAGNOSIS — G89.29: ICD-10-CM

## 2021-07-02 DIAGNOSIS — Z79.899: ICD-10-CM

## 2021-07-02 DIAGNOSIS — Z79.891: ICD-10-CM

## 2021-07-02 DIAGNOSIS — K04.7: Primary | ICD-10-CM

## 2021-07-02 DIAGNOSIS — M54.9: ICD-10-CM

## 2021-07-02 PROCEDURE — 99283 EMERGENCY DEPT VISIT LOW MDM: CPT

## 2021-07-03 VITALS — SYSTOLIC BLOOD PRESSURE: 145 MMHG | DIASTOLIC BLOOD PRESSURE: 78 MMHG

## 2021-07-03 NOTE — ED EENT
History of Present Illness


General


Chief Complaint:  Dental Problems/Pain


Stated Complaint:  ABCESS TOOTH


Nursing Triage Note:  


DENTAL PAIN X3 DAYS.


Source:  patient


Exam Limitations:  no limitations





History of Present Illness


Date Seen by Provider:  Jul 3, 2021


Time Seen by Provider:  01:18


Initial Comments


Patient to the ER by private conveyance from home with chief complaint of 3 to 4

days progressively worsening left lower and upper dental pain.  He has an 

appointment later in the month at [place] to have the teeth extracted.  In the 

past he is used clindamycin successfully to treat his dental pain.  He says the 

viscous lidocaine does not help much.  He has been using Tylenol and ibuprofen 

with his last dose being earlier yesterday morning.  He says it does dull the 

pain.  No fevers or chills difficulty swallowing or vocal changes.





Allergies and Home Medications


Allergies


Coded Allergies:  


     ALEJANDRAANo Known Allergies (Unverified  Allergy, Mild, 4/22/09)


     No Known Drug Allergies (Unverified , 5/2/09)





Home Medications


Amoxicillin 500 Mg Capsule, 500 MG PO TID


   Prescribed by: ADRIANNA FELIPE on 8/27/18 0040


Amoxicillin 500 Mg Capsule, 1,000 MG PO BID


   Prescribed by: JP SWIFT on 8/22/19 0836


Amoxicillin/Potassium Clav 1 Each Tablet, 1 EACH PO BID


   Prescribed by: JOSE ANTONIO NORIEGA on 4/18/17 0335


Amoxicillin/Potassium Clav 1 Each Tab.er.12h, 1 EACH PO BID


   Prescribed by: HOMER LARES on 2/6/18 0240


Chlorhexidine Gluconate 473 Ml Mouthwash, 15 ML MM BID


   swish and spit solution 


   Prescribed by: HOMER LARES on 2/6/18 0240


Chlorhexidine Gluconate 473 Ml Mouthwash, 15 ML MM BID


   Prescribed by: ADRIANNA FELIPE on 8/27/18 0042


Chlorhexidine Gluconate 473 Ml Mouthwash, 30 ML MM BID


   Prescribed by: ADRIANNA FELIPE on 7/3/21 0132


Clindamycin HCl 150 Mg Capsule, 450 MG PO TID


   Prescribed by: ADRIANNA FELIPE on 7/3/21 0132


Hydrocodone Bit/Acetaminophen 1 Tab Tab, 1-2 EACH PO Q6H PRN for PAIN-MODERATE


   Prescribed by: HOMER LARES on 2/6/18 0240


Hydrocodone Bit/Acetaminophen 1 Tab Tab, 1 EACH PO Q4-6HR PRN for PAIN-MODERATE


   Prescribed by: JP SWIFT on 8/22/19 0836


Hydrocortisone/Aloe Vera 28.4 Gm Cream..g., 2 GM TP BID PRN for ITCHING


   Prescribed by: GARCIA CASTRO on 6/15/21 1154


Lidocaine HCl 15 Ml Solution, 0 MM Q 1-2 HOURS


   Prescribed by: JOSE ANTONIO NORIEGA on 4/18/17 0335


Naproxen 500 Mg Tablet, 500 MG PO BID


   Prescribed by: JOSE ANTONIO NORIEGA on 4/18/17 0335


Naproxen 500 Mg Tablet, 500 MG PO BID


   Prescribed by: ADRIANNA FELIPE on 7/3/21 0132





Patient Home Medication List


Home Medication List Reviewed:  Yes





Review of Systems


Review of Systems


Constitutional:  No chills, No diaphoresis


Eyes:  Denies Blindness, Denies Blurred Vision


Ears:  Denies Dizziness, Denies Pain


Nose:  denies clots, denies congestion


Mouth:  denies pain, denies swelling


Throat:  denies pain, denies swelling


Respiratory:  No cough, No phlegm


Cardiovascular:  No chest pain, No palpitations


Gastrointestinal:  No abdominal pain, No nausea





All Other Systems Reviewed


Negative Unless Noted:  Yes





Past Medical-Social-Family Hx


Patient Social History


Tobacco Use?:  No


Use of E-Cig and/or Vaping dev:  No


Substance use?:  No


Alcohol Use?:  No





Immunizations Up To Date


Tetanus Booster (TDap):  Unknown





Seasonal Allergies


Seasonal Allergies:  No





Past Medical History


Surgeries:  No


Respiratory:  No


Cardiac:  No


Neurological:  No


Reproductive Disorders:  No


Sexually Transmitted Disease:  No


HIV/AIDS:  No


Genitourinary:  No


Gastrointestinal:  No


Musculoskeletal:  Yes


Chronic Back Pain


Endocrine:  No


Cancer:  No


Psychosocial:  Yes


Depression


Integumentary:  No


Blood Disorders:  No


Adverse Reaction/Blood Tranf:  No





Family Medical History


No Pertinent Family Hx





Physical Exam


Vital Signs





Vital Signs - First Documented








 7/2/21





 23:28


 


Temp 36.9


 


Pulse 66


 


Resp 16


 


B/P (MAP) 163/87 (112)


 


Pulse Ox 100


 


O2 Delivery Room Air








Height, Weight, BMI


Height: 5'9.00"


Weight: 140lbs. oz. 63.931126rc; 21.00 BMI


Method:Stated


General Appearance:  WD/WN, mild distress


Eyes:  bilateral eye normal inspection, bilateral eye PERRL, bilateral eye EOMI


Ears:  bilateral ear auricle normal, bilateral ear canal normal


Nose:  normal inspection; No active bleeding, No discharge


Mouth/Throat:  other (Advanced dental caries, gingivitis but no pointing or 

obvious externalization of abscess)


Neck:  non-tender, full range of motion, supple, normal inspection


Cardiovascular:  normal peripheral pulses, regular rate, rhythm


Respiratory:  no respiratory distress, no accessory muscle use


Neurologic/Psychiatric:  alert, normal mood/affect, oriented x 3





Progress/Results/Core Measures


Results/Orders


My Orders





Orders - ADRIANNA FELIPE


Rx-Naproxen (Rx-Naprosyn) (7/3/21 01:26)


Lidocaine 2% Viscous 15 Ml (Xylocaine Vi (7/3/21 01:30)


Clindamycin Capsule (Cleocin Capsule) (7/3/21 01:30)





Medications Given in ED





Current Medications








 Medications  Dose


 Ordered  Sig/Maria


 Route  Start Time


 Stop Time Status Last Admin


Dose Admin


 


 Clindamycin HCl  450 mg  ONCE  ONCE


 PO  7/3/21 01:30


 7/3/21 01:31 DC 7/3/21 01:38


450 MG


 


 Lidocaine HCl  5 ml  ONCE  ONCE


 PO  7/3/21 01:30


 7/3/21 01:31 DC 7/3/21 01:38


5 ML








Vital Signs/I&O











 7/2/21 7/3/21





 23:28 01:45


 


Temp 36.9 36.9


 


Pulse 66 71


 


Resp 16 16


 


B/P (MAP) 163/87 (112) 145/78 (112)


 


Pulse Ox 100 100


 


O2 Delivery Room Air Room Air














Blood Pressure Mean:                    112











Progress


Progress Note :  


   Time:  01:28


Progress Note


Clindamycin, viscous lidocaine, naproxen.  Magic mouthwash


The patient declined any IM medicines or inferior alveolar nerve block as he did

not like needles.





Departure


Impression





   Primary Impression:  


   Dental abscess


Disposition:  01 HOME, SELF-CARE


Condition:  Stable





Departure-Patient Inst.


Decision time for Depature:  01:28


Referrals:  


[facility] (PCP/Family)


Primary Care Physician


Patient Instructions:  Tooth Abscess (DC)





Add. Discharge Instructions:  


Magic mouthwash 1 ounce twice daily for the next week.


Naproxen 500 mg twice a day for pain.


Tylenol 1000 mg every 8 hours as necessary for pain.


Do not use ibuprofen with naproxen.


Viscous lidocaine applied to gauze placed over the teeth every 4 hours as 

necessary for the dental pain.


Clindamycin 3 capsules 3 times a day for the next week.


All discharge instructions reviewed with patient and/or family. Voiced 

understanding.


Scripts


Naproxen (Naprosyn) 500 Mg Tablet


500 MG PO BID for 14 Days, #30 TAB 0 Refills


   Prov: ADRIANNA FELIPE         7/3/21 


Chlorhexidine Gluconate (Chlorhexidine Gluconate) 473 Ml Mouthwash


30 ML MM BID for 7 Days, #473 ML 0 Refills


   Prov: ADRIANNA FELIPE         7/3/21 


Clindamycin HCl (Clindamycin HCl) 150 Mg Capsule


450 MG PO TID for 7 Days, #63 CAP 0 Refills


   Prov: ADRIANNA FELIPE         7/3/21











ADRIANNA FELIPE                  Jul 3, 2021 01:32